# Patient Record
Sex: MALE | Race: WHITE | Employment: FULL TIME | URBAN - METROPOLITAN AREA
[De-identification: names, ages, dates, MRNs, and addresses within clinical notes are randomized per-mention and may not be internally consistent; named-entity substitution may affect disease eponyms.]

---

## 2017-04-10 PROBLEM — C67.9 MALIGNANT NEOPLASM OF URINARY BLADDER (HCC): Status: ACTIVE | Noted: 2017-04-10

## 2018-04-09 PROBLEM — K46.9 ABDOMINAL HERNIA: Status: ACTIVE | Noted: 2017-01-16

## 2020-05-20 PROBLEM — R63.4 WEIGHT LOSS: Status: ACTIVE | Noted: 2020-05-13

## 2021-04-09 ENCOUNTER — HOSPITAL ENCOUNTER (OUTPATIENT)
Dept: CT IMAGING | Age: 61
Discharge: HOME | End: 2021-04-09
Attending: UROLOGY
Payer: COMMERCIAL

## 2021-04-09 DIAGNOSIS — N40.1 BPH WITH OBSTRUCTION/LOWER URINARY TRACT SYMPTOMS: ICD-10-CM

## 2021-04-09 DIAGNOSIS — N13.8 BPH WITH OBSTRUCTION/LOWER URINARY TRACT SYMPTOMS: ICD-10-CM

## 2021-04-09 DIAGNOSIS — C67.9 MALIGNANT NEOPLASM OF URINARY BLADDER, UNSPECIFIED SITE (HCC): ICD-10-CM

## 2021-04-09 DIAGNOSIS — R30.0 DYSURIA: ICD-10-CM

## 2021-04-09 PROCEDURE — 74178 CT ABD&PLV WO CNTR FLWD CNTR: CPT

## 2021-04-09 PROCEDURE — 74011000636 HC RX REV CODE- 636: Performed by: UROLOGY

## 2021-04-09 RX ADMIN — IOPAMIDOL 100 ML: 755 INJECTION, SOLUTION INTRAVENOUS at 10:32

## 2021-04-13 ENCOUNTER — HOSPITAL ENCOUNTER (EMERGENCY)
Age: 61
Discharge: ANOTHER HOSPITAL | End: 2021-04-13
Attending: EMERGENCY MEDICINE
Payer: COMMERCIAL

## 2021-04-13 ENCOUNTER — HOSPITAL ENCOUNTER (INPATIENT)
Age: 61
LOS: 3 days | Discharge: HOME | DRG: 885 | End: 2021-04-16
Attending: PSYCHIATRY & NEUROLOGY | Admitting: PSYCHIATRY & NEUROLOGY
Payer: COMMERCIAL

## 2021-04-13 VITALS
WEIGHT: 200 LBS | SYSTOLIC BLOOD PRESSURE: 136 MMHG | TEMPERATURE: 98 F | OXYGEN SATURATION: 100 % | HEIGHT: 74 IN | BODY MASS INDEX: 25.67 KG/M2 | HEART RATE: 114 BPM | DIASTOLIC BLOOD PRESSURE: 101 MMHG | RESPIRATION RATE: 20 BRPM

## 2021-04-13 DIAGNOSIS — R45.851 SUICIDAL IDEATION: ICD-10-CM

## 2021-04-13 DIAGNOSIS — F41.9 SEVERE ANXIETY: Primary | ICD-10-CM

## 2021-04-13 DIAGNOSIS — C67.9 MALIGNANT NEOPLASM OF URINARY BLADDER, UNSPECIFIED SITE (HCC): ICD-10-CM

## 2021-04-13 DIAGNOSIS — F41.1 GAD (GENERALIZED ANXIETY DISORDER): Primary | ICD-10-CM

## 2021-04-13 PROBLEM — F32.A DEPRESSION: Status: ACTIVE | Noted: 2021-04-13

## 2021-04-13 LAB
ALBUMIN SERPL-MCNC: 4.5 G/DL (ref 3.4–5)
ALBUMIN/GLOB SERPL: 1.4 {RATIO} (ref 0.8–1.7)
ALP SERPL-CCNC: 116 U/L (ref 45–117)
ALT SERPL-CCNC: 49 U/L (ref 16–61)
AMPHET UR QL SCN: NEGATIVE
ANION GAP SERPL CALC-SCNC: 5 MMOL/L (ref 3–18)
APAP SERPL-MCNC: <2 UG/ML (ref 10–30)
APPEARANCE UR: CLEAR
AST SERPL-CCNC: 16 U/L (ref 10–38)
BARBITURATES UR QL SCN: NEGATIVE
BASOPHILS # BLD: 0.1 K/UL (ref 0–0.1)
BASOPHILS NFR BLD: 1 % (ref 0–2)
BENZODIAZ UR QL: NEGATIVE
BILIRUB SERPL-MCNC: 0.7 MG/DL (ref 0.2–1)
BILIRUB UR QL: NEGATIVE
BUN SERPL-MCNC: 14 MG/DL (ref 7–18)
BUN/CREAT SERPL: 16 (ref 12–20)
CALCIUM SERPL-MCNC: 8.8 MG/DL (ref 8.5–10.1)
CANNABINOIDS UR QL SCN: NEGATIVE
CHLORIDE SERPL-SCNC: 104 MMOL/L (ref 100–111)
CK MB CFR SERPL CALC: NORMAL % (ref 0–4)
CK MB SERPL-MCNC: <1 NG/ML (ref 5–25)
CK SERPL-CCNC: 91 U/L (ref 39–308)
CO2 SERPL-SCNC: 29 MMOL/L (ref 21–32)
COCAINE UR QL SCN: NEGATIVE
COLOR UR: YELLOW
COVID-19 RAPID TEST, COVR: NOT DETECTED
CREAT SERPL-MCNC: 0.9 MG/DL (ref 0.6–1.3)
DIFFERENTIAL METHOD BLD: ABNORMAL
EOSINOPHIL # BLD: 0.2 K/UL (ref 0–0.4)
EOSINOPHIL NFR BLD: 2 % (ref 0–5)
ERYTHROCYTE [DISTWIDTH] IN BLOOD BY AUTOMATED COUNT: 12.3 % (ref 11.6–14.5)
ETHANOL SERPL-MCNC: <3 MG/DL (ref 0–3)
GLOBULIN SER CALC-MCNC: 3.2 G/DL (ref 2–4)
GLUCOSE SERPL-MCNC: 135 MG/DL (ref 74–99)
GLUCOSE UR STRIP.AUTO-MCNC: NEGATIVE MG/DL
HCT VFR BLD AUTO: 52 % (ref 36–48)
HDSCOM,HDSCOM: NORMAL
HGB BLD-MCNC: 17.4 G/DL (ref 13–16)
HGB UR QL STRIP: NEGATIVE
KETONES UR QL STRIP.AUTO: ABNORMAL MG/DL
LEUKOCYTE ESTERASE UR QL STRIP.AUTO: NEGATIVE
LYMPHOCYTES # BLD: 1.4 K/UL (ref 0.9–3.6)
LYMPHOCYTES NFR BLD: 17 % (ref 21–52)
MAGNESIUM SERPL-MCNC: 2.4 MG/DL (ref 1.6–2.6)
MCH RBC QN AUTO: 29.8 PG (ref 24–34)
MCHC RBC AUTO-ENTMCNC: 33.5 G/DL (ref 31–37)
MCV RBC AUTO: 89.2 FL (ref 74–97)
METHADONE UR QL: NEGATIVE
MONOCYTES # BLD: 0.6 K/UL (ref 0.05–1.2)
MONOCYTES NFR BLD: 7 % (ref 3–10)
NEUTS SEG # BLD: 5.8 K/UL (ref 1.8–8)
NEUTS SEG NFR BLD: 72 % (ref 40–73)
NITRITE UR QL STRIP.AUTO: NEGATIVE
OPIATES UR QL: NEGATIVE
PCP UR QL: NEGATIVE
PH UR STRIP: 6 [PH] (ref 5–8)
PLATELET # BLD AUTO: 315 K/UL (ref 135–420)
PMV BLD AUTO: 8.8 FL (ref 9.2–11.8)
POTASSIUM SERPL-SCNC: 3.4 MMOL/L (ref 3.5–5.5)
PROT SERPL-MCNC: 7.7 G/DL (ref 6.4–8.2)
PROT UR STRIP-MCNC: NEGATIVE MG/DL
RBC # BLD AUTO: 5.83 M/UL (ref 4.35–5.65)
SALICYLATES SERPL-MCNC: <1.7 MG/DL (ref 2.8–20)
SODIUM SERPL-SCNC: 138 MMOL/L (ref 136–145)
SOURCE, COVRS: NORMAL
SP GR UR REFRACTOMETRY: 1.02 (ref 1–1.03)
TROPONIN I SERPL-MCNC: <0.02 NG/ML (ref 0–0.04)
UROBILINOGEN UR QL STRIP.AUTO: 1 EU/DL (ref 0.2–1)
WBC # BLD AUTO: 8 K/UL (ref 4.6–13.2)

## 2021-04-13 PROCEDURE — 83735 ASSAY OF MAGNESIUM: CPT

## 2021-04-13 PROCEDURE — 82077 ASSAY SPEC XCP UR&BREATH IA: CPT

## 2021-04-13 PROCEDURE — 80143 DRUG ASSAY ACETAMINOPHEN: CPT

## 2021-04-13 PROCEDURE — 74011250637 HC RX REV CODE- 250/637: Performed by: PSYCHIATRY & NEUROLOGY

## 2021-04-13 PROCEDURE — 93005 ELECTROCARDIOGRAM TRACING: CPT

## 2021-04-13 PROCEDURE — 65220000003 HC RM SEMIPRIVATE PSYCH

## 2021-04-13 PROCEDURE — 81003 URINALYSIS AUTO W/O SCOPE: CPT

## 2021-04-13 PROCEDURE — 74011250637 HC RX REV CODE- 250/637: Performed by: EMERGENCY MEDICINE

## 2021-04-13 PROCEDURE — 87635 SARS-COV-2 COVID-19 AMP PRB: CPT

## 2021-04-13 PROCEDURE — 99285 EMERGENCY DEPT VISIT HI MDM: CPT

## 2021-04-13 PROCEDURE — 80053 COMPREHEN METABOLIC PANEL: CPT

## 2021-04-13 PROCEDURE — 80179 DRUG ASSAY SALICYLATE: CPT

## 2021-04-13 PROCEDURE — 80307 DRUG TEST PRSMV CHEM ANLYZR: CPT

## 2021-04-13 PROCEDURE — 85025 COMPLETE CBC W/AUTO DIFF WBC: CPT

## 2021-04-13 PROCEDURE — 82553 CREATINE MB FRACTION: CPT

## 2021-04-13 RX ORDER — TAMSULOSIN HYDROCHLORIDE 0.4 MG/1
0.4 CAPSULE ORAL EVERY EVENING
Status: DISCONTINUED | OUTPATIENT
Start: 2021-04-13 | End: 2021-04-16 | Stop reason: HOSPADM

## 2021-04-13 RX ORDER — LORAZEPAM 1 MG/1
1 TABLET ORAL
Status: COMPLETED | OUTPATIENT
Start: 2021-04-13 | End: 2021-04-13

## 2021-04-13 RX ORDER — PAROXETINE HYDROCHLORIDE 20 MG/1
20 TABLET, FILM COATED ORAL DAILY
Status: DISCONTINUED | OUTPATIENT
Start: 2021-04-14 | End: 2021-04-14

## 2021-04-13 RX ORDER — THERA TABS 400 MCG
1 TAB ORAL DAILY
Status: DISCONTINUED | OUTPATIENT
Start: 2021-04-14 | End: 2021-04-16 | Stop reason: HOSPADM

## 2021-04-13 RX ORDER — HYDROXYZINE PAMOATE 50 MG/1
50 CAPSULE ORAL
Status: DISCONTINUED | OUTPATIENT
Start: 2021-04-13 | End: 2021-04-16 | Stop reason: HOSPADM

## 2021-04-13 RX ORDER — TRAZODONE HYDROCHLORIDE 50 MG/1
50 TABLET ORAL
Status: DISCONTINUED | OUTPATIENT
Start: 2021-04-13 | End: 2021-04-16 | Stop reason: HOSPADM

## 2021-04-13 RX ADMIN — LORAZEPAM 1 MG: 1 TABLET ORAL at 09:06

## 2021-04-13 RX ADMIN — TRAZODONE HYDROCHLORIDE 50 MG: 50 TABLET ORAL at 20:16

## 2021-04-13 RX ADMIN — TAMSULOSIN HYDROCHLORIDE 0.4 MG: 0.4 CAPSULE ORAL at 18:41

## 2021-04-13 RX ADMIN — HYDROXYZINE PAMOATE 50 MG: 50 CAPSULE ORAL at 20:16

## 2021-04-13 NOTE — PROGRESS NOTES
contacted by ED for voluntary inpt psych placement. If at any time Patient becomes involuntary- ED will need to contact Police for a paperless ECO (Emergency Custody Order) who will then call [facility] directly to assist with TDO as needed.  will contact all facilities and they will contact ED directly for questions or acceptances. CM does not need to be notified by staff once bed confirmed to ED by facility. Once all facilities have been contacted should a bed not be available through today. CM will resume search in the morning and continue to work toward transition of care.           CM contacted and provided MRN  to [facility], for review    CM contacted and provided MRN  To [facility], [place], and [facility] for review       CM faxed clinical information to [facility] for review    CM faxed clinical information to [facility] for review    CM faxed clinical information to [address] [place] [address] for review     CM faxed clinical information to [email or web address] [facility]  for review    CM faxed clinical information to [facility]  for review    CM faxed clinical information to [facility] for review     CM faxed clinical information to [facility]  for review    CM faxed clinical information to [employer] for review    CM faxed clinical information to Phuc Barr , [facility], [address] [place] [address], Hiram Guo  for review    CM faxed clinical information to [facility] for review     CM faxed clinical information to [facility]  for review    CM faxed clinical information to [facility]  for review    CM faxed clinical information to  [facility] for review     CM faxed clinical information to [facility]   for review    CM faxed clinical information to [facility] for review     CM faxed clinical information to [facility]  for review    CM faxed clinical information to [facility]  for review    CM faxed clinical information to [facility] for review     CM faxed clinical information to [facility] for review    CM faxed clinical information to [facility] for review    CM faxed clinical information to Lucy Velásquez for review    CM faxed clinical information to [facility]  for review

## 2021-04-13 NOTE — ED NOTES
While provider at bedside pt reported to him that he was suicidal. Sitter placed at bedside, and clothing being removed.  To be placed in paper scrubs, belongings to be secured at nurses station and blood and urine to be obtained

## 2021-04-13 NOTE — H&P
History and Physical        Patient: Erasmo Cortes Sex: male          DOA: 4/13/2021         YOB: 1960      Age:  64 y.o.        LOS:  LOS: 0 days        HPI:     Erasmo Cortes is a 64 y.o. ran out of medication, Ambien, was unable to sleep, was previously  prescribed Xanax. Was denied Xanax in ER and became suicidal.    Active Problems:    Depression (4/13/2021)        Past Medical History:   Diagnosis Date    Bladder cancer [facility])     Kidney stones        Past Surgical History:   Procedure Laterality Date    HX OTHER SURGICAL  11/14/2014    TURBT, Dr. Chaya Bernal [facility]  12/26/14    TURBT, [facility], Dr. Kolton Loredo  2007       Family History   Family history unknown: Yes       Social History     Socioeconomic History    Marital status: SINGLE     Spouse name: Not on file    Number of children: Not on file    Years of education: Not on file    Highest education level: Not on file   Tobacco Use    Smoking status: Never Smoker    Smokeless tobacco: Never Used   Substance and Sexual Activity    Alcohol use: Yes    Drug use: No       Prior to Admission medications    Medication Sig Start Date End Date Taking? Authorizing Provider   tamsulosin (FLOMAX) 0.4 mg capsule Take 1 Cap by mouth daily (after dinner). 4/12/21   Niels Krabbe, PA-C   trospium (SANCTURA XL) 60 mg capsule Take 1 Cap by mouth two (2) times a day. 3/31/21   Mary Hines NP   ALPRAZolam Kulwant Yarbrough) 0.5 mg tablet take 1 tablet by mouth twice a day if needed for anxiety 3/18/21   Provider, Historical   PARoxetine (PAXIL) 20 mg tablet take 1 tablet by mouth every morning 10/12/20   Provider, Historical   rosuvastatin (CRESTOR) 20 mg tablet take 1 tablet by mouth once daily 8/20/20   Provider, Historical   multivitamin (ONE A DAY) tablet Take 1 Tab by mouth daily.     Provider, Historical       No Known Allergies    Review of Systems  A comprehensive review of systems was negative except for that written in the History of Present Illness. Physical Exam:      Visit Vitals  /88 (BP 1 Location: Right arm, BP Patient Position: Sitting)   Pulse (!) 118   Temp 98 °F (36.7 °C)   Resp 22       Physical Exam:  Physical Exam: Patient complained of not sleeping well and mild discomfort in left side of abdomen. General:  Alert, cooperative, no distress, appears stated age. Eyes:  Conjunctivae/corneas clear. PERRL, EOMs intact. Fundi benign   Ears:  Normal TMs and external ear canals both ears. Nose: Nares normal. Septum midline. Mucosa normal. No drainage or sinus tenderness. Mouth/Throat: Lips, mucosa, and tongue normal. Teeth and gums normal.   Neck: Supple, symmetrical, trachea midline, no adenopathy, thyroid: no enlargement/tenderness/nodules, no carotid bruit and no JVD. Back:   Symmetric, no curvature. ROM normal. No CVA tenderness. Lungs:   Clear to auscultation bilaterally. Heart:  Regular rate and rhythm, S1, S2 normal, no murmur, click, rub or gallop. Abdomen:   Soft, non-tender. Bowel sounds normal. No masses,  No organomegaly. Lower left side, mildly TTP   Extremties: Extremities normal, atraumatic, no cyanosis or edema. Pulses: 2+ and symmetric all extremities. Skin: Skin color, texture, turgor normal. No rashes or lesions   Lymph nodes: Cervical, supraclavicular, and axillary nodes normal.   Neurologic: CNII-XII intact. Normal strength, sensation and reflexes throughout. Assessment/Plan     Patient was appropriate and calm.  Plan is for patent to participate in all unit activities, take medications as ordered and follow all discharge orders

## 2021-04-13 NOTE — ED PROVIDER NOTES
EMERGENCY DEPARTMENT HISTORY AND PHYSICAL EXAM    Date: 4/13/2021  Patient Name: Clinton Turner. History of Presenting Illness     Chief Complaint   Patient presents with    Mental Health Problem         History Provided By: Patient    Additional History (Context): Clinton Brooke is a 64 y.o. male with PMHX of malignant neoplasm of urinary bladder with staging of T1 N0 M0 urothelial carcinoma of bladder status post TURBT on November 14, 2014, severe anxiety regarding health who presents to the emergency department C/O exacerbation of exam.  Patient used to be prescribed Xanax to help for sleep which is no longer taking and they have switched him over to Ambien. He arrived asking to be admitted for his anxiety. I explained to him that he is not even taking any medications and they would not admit him for simple anxiety issues. I was called away from the room to see some of the patients when I returned he stated that he was now suicidal as the anxiety is so overwhelming he cannot function in life. His decision was that he would go home and overdose on pills other prescription or over-the-counter most likely Tylenol as he knows that this medication is toxic. Denies any suicide attempts in the past he denies any concerns regarding self-mutilation. Social History  Denies smoking drinking or drugs    Family History  No history of depression anxiety or suicide attempts      PCP: Abhishek Bryant NP    Current Outpatient Medications   Medication Sig Dispense Refill    tamsulosin (FLOMAX) 0.4 mg capsule Take 1 Cap by mouth daily (after dinner). 90 Cap 3    PARoxetine (PAXIL) 20 mg tablet take 1 tablet by mouth every morning      multivitamin (ONE A DAY) tablet Take 1 Tab by mouth daily.  trospium (SANCTURA XL) 60 mg capsule Take 1 Cap by mouth two (2) times a day.  60 Cap 0    ALPRAZolam (XANAX) 0.5 mg tablet take 1 tablet by mouth twice a day if needed for anxiety      rosuvastatin (CRESTOR) 20 mg tablet take 1 tablet by mouth once daily         Past History     Past Medical History:  Past Medical History:   Diagnosis Date    Bladder cancer ([address]     Kidney stones        Past Surgical History:  Past Surgical History:   Procedure Laterality Date    HX OTHER SURGICAL  11/14/2014    TURBT, Dr. Del Rosario Gelacio  12/26/14    TURBT, [facility], Dr. Frida Milan  2007       Family History:  Family History   Family history unknown: Yes       Social History:  Social History     Tobacco Use    Smoking status: Never Smoker    Smokeless tobacco: Never Used   Substance Use Topics    Alcohol use: Yes    Drug use: No       Allergies:  No Known Allergies      Review of Systems   Review of Systems   Constitutional: Negative for activity change and appetite change. Genitourinary: Positive for frequency. Hematological: Does not bruise/bleed easily. Psychiatric/Behavioral: Positive for dysphoric mood and suicidal ideas. The patient is nervous/anxious. All other systems reviewed and are negative. Physical Exam     Vitals:    04/13/21 0234 04/13/21 0540   BP: 132/86 (!) 143/97   Pulse: (!) 130 (!) 110   Resp: 16 16   Temp: 98 °F (36.7 °C) 98 °F (36.7 °C)   SpO2: 100% 97%   Weight: 90.7 kg (200 lb)    Height: 6' 2\" (1.88 m)      Physical Exam  Vitals signs and nursing note reviewed. Constitutional:       General: He is not in acute distress. Appearance: He is well-developed. He is not ill-appearing, toxic-appearing or diaphoretic. HENT:      Head: Normocephalic and atraumatic. Eyes:      General: No scleral icterus. Extraocular Movements:      Right eye: Normal extraocular motion. Left eye: Normal extraocular motion. Conjunctiva/sclera: Conjunctivae normal.      Pupils: Pupils are equal, round, and reactive to light. Neck:      Musculoskeletal: Normal range of motion and neck supple. Trachea: No tracheal deviation. Cardiovascular:      Rate and Rhythm: Regular rhythm. Tachycardia present. Heart sounds: Normal heart sounds. Pulmonary:      Effort: Pulmonary effort is normal. No respiratory distress. Breath sounds: Normal breath sounds. No stridor. Abdominal:      General: Bowel sounds are normal. There is no distension. Palpations: Abdomen is soft. Tenderness: There is no abdominal tenderness. There is no rebound. Musculoskeletal: Normal range of motion. General: No tenderness. Comments: Grossly unremarkable without abnormalities   Skin:     General: Skin is warm and dry. Capillary Refill: Capillary refill takes less than 2 seconds. Findings: No erythema or rash. Neurological:      Mental Status: He is alert and oriented to person, place, and time. GCS: GCS eye subscore is 4. GCS verbal subscore is 5. GCS motor subscore is 6. Cranial Nerves: No cranial nerve deficit. Motor: No weakness. Deep Tendon Reflexes: Reflexes normal.   Psychiatric:         Mood and Affect: Mood normal.         Speech: Speech is rapid and pressured. Behavior: Behavior normal.         Thought Content: Thought content normal.         Judgment: Judgment normal.      Comments: Initially, then developed rapid and pressured speech has he began expressing suicidal ideation.        Diagnostic Study Results     Labs -     Recent Results (from the past 12 hour(s))   COVID-19 RAPID TEST    Collection Time: 04/13/21  5:30 AM   Result Value Ref Range    Specimen source Nasopharyngeal      COVID-19 rapid test Not detected NOTD     URINALYSIS W/ RFLX MICROSCOPIC    Collection Time: 04/13/21  5:30 AM   Result Value Ref Range    Color YELLOW      Appearance CLEAR      Specific gravity 1.024 1.005 - 1.030      pH (UA) 6.0 5.0 - 8.0      Protein Negative NEG mg/dL    Glucose Negative NEG mg/dL    Ketone TRACE (A) NEG mg/dL    Bilirubin Negative NEG      Blood Negative NEG      Urobilinogen 1.0 0.2 - 1.0 EU/dL    Nitrites Negative NEG      Leukocyte Esterase Negative NEG     DRUG SCREEN, URINE    Collection Time: 04/13/21  5:30 AM   Result Value Ref Range    BENZODIAZEPINES Negative NEG      BARBITURATES Negative NEG      THC (TH-CANNABINOL) Negative NEG      OPIATES Negative NEG      PCP(PHENCYCLIDINE) Negative NEG      COCAINE Negative NEG      AMPHETAMINES Negative NEG      METHADONE Negative NEG      HDSCOM (NOTE)    EKG, 12 LEAD, INITIAL    Collection Time: 04/13/21  5:31 AM   Result Value Ref Range    Ventricular Rate 111 BPM    Atrial Rate 111 BPM    P-R Interval 152 ms    QRS Duration 84 ms    Q-T Interval 326 ms    QTC Calculation (Bezet) 443 ms    Calculated P Axis 43 degrees    Calculated R Axis 50 degrees    Calculated T Axis 43 degrees    Diagnosis       Sinus tachycardia  Otherwise normal ECG  No previous ECGs available     CBC WITH AUTOMATED DIFF    Collection Time: 04/13/21  5:33 AM   Result Value Ref Range    WBC 8.0 4.6 - 13.2 K/uL    RBC 5.83 (H) 4.35 - 5.65 M/uL    HGB 17.4 (H) 13.0 - 16.0 g/dL    HCT 52.0 (H) 36.0 - 48.0 %    MCV 89.2 74.0 - 97.0 FL    MCH 29.8 24.0 - 34.0 PG    MCHC 33.5 31.0 - 37.0 g/dL    RDW 12.3 11.6 - 14.5 %    PLATELET 643 376 - 700 K/uL    MPV 8.8 (L) 9.2 - 11.8 FL    NEUTROPHILS 72 40 - 73 %    LYMPHOCYTES 17 (L) 21 - 52 %    MONOCYTES 7 3 - 10 %    EOSINOPHILS 2 0 - 5 %    BASOPHILS 1 0 - 2 %    ABS. NEUTROPHILS 5.8 1.8 - 8.0 K/UL    ABS. LYMPHOCYTES 1.4 0.9 - 3.6 K/UL    ABS. MONOCYTES 0.6 0.05 - 1.2 K/UL    ABS. EOSINOPHILS 0.2 0.0 - 0.4 K/UL    ABS.  BASOPHILS 0.1 0.0 - 0.1 K/UL    DF AUTOMATED     METABOLIC PANEL, COMPREHENSIVE    Collection Time: 04/13/21  5:33 AM   Result Value Ref Range    Sodium 138 136 - 145 mmol/L    Potassium 3.4 (L) 3.5 - 5.5 mmol/L    Chloride 104 100 - 111 mmol/L    CO2 29 21 - 32 mmol/L    Anion gap 5 3.0 - 18 mmol/L    Glucose 135 (H) 74 - 99 mg/dL    BUN 14 7.0 - 18 MG/DL    Creatinine 0.90 0.6 - 1.3 MG/DL    BUN/Creatinine ratio 16 12 - 20      GFR est AA >60 >60 ml/min/1.73m2    GFR est non-AA >60 >60 ml/min/1.73m2    Calcium 8.8 8.5 - 10.1 MG/DL    Bilirubin, total 0.7 0.2 - 1.0 MG/DL    ALT (SGPT) 49 16 - 61 U/L    AST (SGOT) 16 10 - 38 U/L    Alk. phosphatase 116 45 - 117 U/L    Protein, total 7.7 6.4 - 8.2 g/dL    Albumin 4.5 3.4 - 5.0 g/dL    Globulin 3.2 2.0 - 4.0 g/dL    A-G Ratio 1.4 0.8 - 1.7     MAGNESIUM    Collection Time: 04/13/21  5:33 AM   Result Value Ref Range    Magnesium 2.4 1.6 - 2.6 mg/dL   CARDIAC PANEL,(CK, CKMB & TROPONIN)    Collection Time: 04/13/21  5:33 AM   Result Value Ref Range    CK - MB <1.0 <3.6 ng/ml    CK-MB Index  0.0 - 4.0 %     CALCULATION NOT PERFORMED WHEN RESULT IS BELOW LINEAR LIMIT    CK 91 39 - 308 U/L    Troponin-I, QT <0.02 0.0 - 0.045 NG/ML   ETHYL ALCOHOL    Collection Time: 04/13/21  5:33 AM   Result Value Ref Range    ALCOHOL(ETHYL),SERUM <3 0 - 3 MG/DL   ACETAMINOPHEN    Collection Time: 04/13/21  5:33 AM   Result Value Ref Range    Acetaminophen level <2 (L) 10.0 - 49.3 ug/mL   SALICYLATE    Collection Time: 04/13/21  5:33 AM   Result Value Ref Range    Salicylate level <9.4 (L) 2.8 - 20.0 MG/DL       Radiologic Studies -   No orders to display     CT Results  (Last 48 hours)    None        CXR Results  (Last 48 hours)    None          Medications given in the ED-  Medications - No data to display      Medical Decision Making   I am the first provider for this patient. I reviewed the vital signs, available nursing notes, past medical history, past surgical history, family history and social history. Vital Signs-Reviewed the patient's vital signs. Pulse Oximetry Analysis -97% on room air    Cardiac Monitor:  Rate: 103 bpm  Rhythm: Borderline sinus tach    EKG interpretation: (Preliminary)  6:36 AM   Sinus tachycardia, rate 111, normal ST segments, no preexcitation, QTC is 443.   EKG read by Cyrus Alexis MD     Records Reviewed: NURSING NOTES AND PREVIOUS MEDICAL RECORDS    Provider Notes (Medical Decision Making):   Patient was initially a acute anxiety exacerbation but after brief conversation he repeatedly was asking to be admitted for anxiety. Is a repeatedly explained to him that admission for anxiety was not an option he then switched his argument then returned stating that he was suicidal.  I honestly believe this is a manipulation however he clearly explains he desired plan to go home and overdose and either hold sedative hypnotic Ambien which she does have lying around the house or simple Tylenol as he knows this medication is quite effective and causing liver damage. As he explained this to me we did switch and elects to perform medical clearance labs including urine EKG and see case management for assistance in placement. Procedures:  Procedures    ED Course:   4:36 AM: Initial assessment performed. The patients presenting problems have been discussed, and they are in agreement with the care plan formulated and outlined with them. I have encouraged them to ask questions as they arise throughout their visit. SIGN OUT:  7:09 AM  Patient's presentation, labs/imaging and plan of care was reviewed with Dr. Christel Nguyen as part of sign out. They will continue to monitor and await his input from case management as part of the plan discussed with the patient. 10:00 AM  Updated patient on all results and plan. All questions answered. TRANSFER PROGRESS NOTE:  10:01 AM  Discussed impending transfer with patient. Patient was instructed that [facility] not have inpatient psychiatric services services and that patient would be transferred to [facility] behavioral center. Patient understands and agrees with care plan. .       Diagnosis and Disposition     CRITICAL CARE NOTE:  10:49 AM  I have spent 45 minutes of critical care time involved in lab review, consultations with specialist, family decision-making, and documentation.   During this entire length of time I was immediately available to the patient. Critical Care: The reason for providing this level of medical care for this critically ill patient was due a critical illness that impaired one or more vital organ systems such that there was a high probability of imminent or life threatening deterioration in the patients condition. This care involved high complexity decision making to assess, manipulate, and support vital system functions, to treat this degreee vital organ system failure and to prevent further life threatening deterioration of the patients condition. CLINICAL IMPRESSION:    1. Severe anxiety    2. Suicidal ideation    3. Malignant neoplasm of urinary bladder, unspecified site ([facility] [address]        PLAN:  1. Transfer to inpatient psychiatric unit at [facility] for further psychiatric management.  ________________    This note was partially transcribed via voice recognition software. Although efforts have been made to catch any discrepancies, it may contain sound alike words, grammatical errors, or nonsensical words.

## 2021-04-13 NOTE — BSMART NOTE
OCCUPATIONAL THERAPY PROGRESS NOTE Group Time:  0208 Attendance: The patient attended full group. Participation: The patient participated fully in the activity. Attention: The patient was able to focus on the activity. Interaction: The patient occasionally  interacts with others. States he has gone through depressions much of his life, not constant. Denies active suicidal thoughts. Participated actively. States he worries about many things and this is a change he wants to work on. Discussed various ways to begin this.

## 2021-04-13 NOTE — ED TRIAGE NOTES
Patient arrives ambulatory from home c/o stress issues about his health. Patient states he has been told his health is fine but cannot destress. Denies SI/ HI. Reports he feels like he cannot relax. AOX4. Patient states he took xanax that helped but think he needs something stronger. Patient reports the anxiety is worse at night.

## 2021-04-13 NOTE — ED NOTES
Contacted by Case management at this time with an accepting physician MD Alfonso Hollis at [facility]. To call report in 20 minutes per Case management.

## 2021-04-13 NOTE — ED NOTES
Report called to [facility] to [facility]. All questions asked and answered.

## 2021-04-13 NOTE — BH NOTES
64year old  male admitted to unit for SI with a plan to overdose on his sleeping pills. Past medical and psychiatric history to include bladder cancer, kidney stones, enlarged prostrate, abdominal hernia with mesh repair, foot drop to his Right leg, diverticulosis, depression, and anxiety. Pt denies having an outpatient therapist. Primary Care Provider is Nurse Yumiko Sim at [facility]. See Medication reconciliation for detailed medication list. Pt states, \"I took Lexapro, Paxil, and Celexa in the past.\" Urine drug screen  negative. [address] <3. No Known Allergies. COVID negative. Upon arrival to the unit, pt presents with dull affect, depressed mood, anxious at times. Pt states, \"I get obsessive thoughts about my bladder cancer coming back. I got tested back in March, and everything was clear. It just always crosses my mind. I can't fall asleep because I just get so worried. I ran out of my Ambien last night, and didn't know what to do. I needed something for anxiety. I feel like I've lost 10 lbs in the past month because I have no appetite. \" Pt was compliant with admission process. Admission paperwork placed in pt's chart. Pt was searched for contraband and none was found. Pt was oriented to unit and encouraged to seek staff for questions or concerns. Will continue to monitor. RN's will initiate, develop, implement, review, and revise treatment plan.

## 2021-04-13 NOTE — ED NOTES
When asked about suicidal ideation patient states \"Yeah, I've had thoughts of being and wishing I was dead, but I don't think I would ever do it. I don't actually want to be dead. \" Patient is low risk for suicide per MD Pipo Gayle.

## 2021-04-13 NOTE — ED NOTES
Pt reports feeling nervous about health concerns. No specific Sx.  To wait for MD mckeon before any additional treatments

## 2021-04-13 NOTE — PROGRESS NOTES
Chart reviewed noted elevated blood pressure, facilities will need more stable readings to be considered for admission and medically cleared.

## 2021-04-14 PROBLEM — K46.9 ABDOMINAL HERNIA: Status: RESOLVED | Noted: 2017-01-16 | Resolved: 2021-04-14

## 2021-04-14 PROBLEM — F33.1 MODERATE EPISODE OF RECURRENT MAJOR DEPRESSIVE DISORDER (HCC): Chronic | Status: ACTIVE | Noted: 2021-04-13

## 2021-04-14 LAB
ATRIAL RATE: 111 BPM
CALCULATED P AXIS, ECG09: 43 DEGREES
CALCULATED R AXIS, ECG10: 50 DEGREES
CALCULATED T AXIS, ECG11: 43 DEGREES
DIAGNOSIS, 93000: NORMAL
P-R INTERVAL, ECG05: 152 MS
Q-T INTERVAL, ECG07: 326 MS
QRS DURATION, ECG06: 84 MS
QTC CALCULATION (BEZET), ECG08: 443 MS
VENTRICULAR RATE, ECG03: 111 BPM

## 2021-04-14 PROCEDURE — 74011250637 HC RX REV CODE- 250/637: Performed by: PSYCHIATRY & NEUROLOGY

## 2021-04-14 PROCEDURE — 65220000003 HC RM SEMIPRIVATE PSYCH

## 2021-04-14 PROCEDURE — 99222 1ST HOSP IP/OBS MODERATE 55: CPT | Performed by: PSYCHIATRY & NEUROLOGY

## 2021-04-14 RX ORDER — CLONAZEPAM 0.5 MG/1
0.5 TABLET ORAL DAILY
Status: DISCONTINUED | OUTPATIENT
Start: 2021-04-14 | End: 2021-04-16 | Stop reason: HOSPADM

## 2021-04-14 RX ORDER — SERTRALINE HYDROCHLORIDE 50 MG/1
50 TABLET, FILM COATED ORAL DAILY
Status: DISCONTINUED | OUTPATIENT
Start: 2021-04-14 | End: 2021-04-16 | Stop reason: HOSPADM

## 2021-04-14 RX ORDER — CLONAZEPAM 0.5 MG/1
1 TABLET ORAL
Status: DISCONTINUED | OUTPATIENT
Start: 2021-04-14 | End: 2021-04-16 | Stop reason: HOSPADM

## 2021-04-14 RX ADMIN — CLONAZEPAM 1 MG: 0.5 TABLET ORAL at 20:24

## 2021-04-14 RX ADMIN — TRAZODONE HYDROCHLORIDE 50 MG: 50 TABLET ORAL at 20:24

## 2021-04-14 RX ADMIN — CLONAZEPAM 0.5 MG: 0.5 TABLET ORAL at 13:32

## 2021-04-14 RX ADMIN — TAMSULOSIN HYDROCHLORIDE 0.4 MG: 0.4 CAPSULE ORAL at 20:24

## 2021-04-14 RX ADMIN — SERTRALINE 50 MG: 50 TABLET, FILM COATED ORAL at 13:33

## 2021-04-14 RX ADMIN — HYDROXYZINE PAMOATE 50 MG: 50 CAPSULE ORAL at 06:59

## 2021-04-14 NOTE — PROGRESS NOTES
Problem: Suicide  Goal: *STG: Remains safe in hospital  Description: Pt will be free of SI, intent, or plan as well as self-injurious behaviors every shift. Outcome: Progressing Towards Goal  Goal: *STG/LTG: Complies with medication therapy  Description: Pt will take medications as prescribed every shift. Outcome: Progressing Towards Goal   Pt. is visible in the dayroom. He interacts well with his peers and staff. He attended and participated the evening group. He denies SI,HI, or AVH. Will continue to monitor for safety and provide support as needed.

## 2021-04-14 NOTE — GROUP NOTE
[facility]  GROUP DOCUMENTATION INDIVIDUAL Group Therapy Note Date: 4/13/2021 Group Start Time: 1 Group End Time: 1945 Group Topic: Nursing [facility] 1 SPECIAL TRTMT 1 Melvina Patel RN 
 
[facility] GROUP DOCUMENTATION GROUP Group Therapy Note Attendees: 5 Attendance: Attended Patient's Goal:  Self love. Interventions/techniques: Informed, Validated and Promoted peer support Follows Directions: Followed directions Interactions: Interacted appropriately Mental Status: Calm Behavior/appearance: Cooperative Goals Achieved: Able to engage in interactions, Able to listen to others and Identified feelings Laureano No RN

## 2021-04-14 NOTE — H&P
[address]  HISTORY AND PHYSICAL    Name:  Mechelle Simental  MR#:   [id number]  :  1960  ACCOUNT #:  [de-identified]  ADMIT DATE:  2021    IDENTIFYING DATA:  The patient is a 57-year-old single white male, resident of [place], [place], who is covered by [employer]. He works as a  for a [employer]. BASIS FOR ADMISSION:  The patient is admitted after referral to us following presentation to emergency room for depression and anxiety. He has a long history of depression and panic, having been treated over the past 10 years with a variety of serotonin reuptake inhibitors, knowing that he had been on Celexa which helped him to feel better, then later on Lexapro which worked first time, and then a second time when he tried, it caused him to feel again anxiety disorder and then Paxil. He had been using Paxil intermittently several times, discontinued it each time when he would start to feel better. He also was having sleep problems, so he would take alprazolam twice a day, especially at night for sleep. This was no longer working, and his family doctor had switched him over to Ambien 5 mg at night which was not helping, and the patient had changed it on his own up to 15 mg which still did not work for sleep. He does have a history since  of malignancy of the bladder and said he had been clear for the past 5 years, but does continue to worry about this. He recently had return of a lower abdomen pain and again got increasing anxiety. Nothing was found of this. He had resumed back on Paxil 20 mg which then did not seem to work for him. He was switched to Lexapro 20 mg which made him tense and then went back on Paxil again which did not feel to be working this time. He described sleep problems, anxiety, panic, depression, somewhat helpless feelings. He said that he would startle easily.   He continued to obsess on possibility of and worries of a negative outcome. He was receiving medicines from his nurse practitioner MsTroy Venkat Estrella through [facility]. Most recent medication was Paxil 10 mg and Ambien 15 mg at bedtime. The Ambien was not prescribed at this dose, which was higher than recommended dosing. MEDICAL HISTORY:  Significant for the above-noted neoplasm of the bladder. He does have history of abdominal wall hernia, history of diverticulosis as well as polyps of the colon, history of elevated PSA, history of benign prostatic hypertrophy. History of hyperlipidemia. Review of systems was otherwise negative    ALLERGIES:  HE DENIED FOOD OR DRUG ALLERGIES. DIAGNOSTIC DATA:  Laboratory testing done in the emergency room included CBC which showed elevation of hemoglobin 17.4 g/dL and elevated hematocrit 52.0 with the remainder normal.  Urinalysis showed a concentrated specimen with specific gravity 1.024, trace ketones but otherwise normal.  The comprehensive metabolic panel showed slight decreased potassium 3.4 mmol/L, slight elevation of glucose 136 mg/dL on a spot blood draw, normal liver function tests, normal CPK, negative acetaminophen and salicylate levels, negative urine drug screen, negative alcohol level, negative rapid SARS COVID test.  The 12-lead EKG showed ventricular rate 111 beats and sinus tachycardia, otherwise normal EKG. SUBSTANCE USE HISTORY:  The patient denied drug, alcohol or tobacco abuse. He had quit using a chewing tobacco.    SOCIAL HISTORY AND FAMILY HISTORY:  Family history is significant for uncle with psychiatric history, father with Parkinson's disease, another uncle with ALS. He does work as a . He had been working from home. MENTAL STATUS EXAMINATION:  Revealed him to be somewhat odd white male. Speech was fluent. Mood was unhappy to anxious with a somewhat odd affect. Thought processing was logical and goal-directed.   He denied hallucinatory or delusional material.  Memory and cognition were intact. He had endorsed suicidal ideas at the time of admission, but he was denying them currently. IQ was estimated in the normal range. Insight and judgment were influenced by his anxiety and unhappiness with life situation. ASSESSMENT:  AXIS I:  Major depression, recurrent, moderate. Generalized anxiety disorder. AXIS II:  Schizotypal and compulsive personality features. AXIS III:  History of malignant neoplasm of bladder. Benign prostatic hypertrophy. History of polyp and diverticulosis of colon. History of elevated prostate-specific antigen. History of hyperlipidemia. TREATMENT PLAN:  This patient is admitted voluntarily after presentation to emergency room. He was feeling overwhelmed with situation and was unable to get emergency evaluation as outpatient. This caused him feel more anxious with thought of suicide and could not contract for safety. We will continue with individual, group and milieu therapies, art and recreation therapy, case management services, social work services. We will write for clonazepam 0.5 mg in the morning and 1 mg at night for sleep and anxiety and initiate Zoloft antidepressant 50 mg daily. He may require higher dosing of this, but this will be decided as an outpatient. He will require a referral to practice in the [facility], and I also recommend he be set up to see a therapist, especially work on supportive counseling and anxiety management. ESTIMATED LENGTH OF STAY:  3-4 days.       Marlys Youssef MD      [id number]  D:  04/14/2021 13:17  T:  04/14/2021 16:37  JOB #:  [id number]

## 2021-04-14 NOTE — PROGRESS NOTES
conducted an Spirituality Group for [employer] who is a 64 y. o.,male. Patient's Primary Language is: [place]. According to the patient's EMR Religion Affiliation is: Daniel Bansal. The reason the Patient came to the hospital is:   Patient Active Problem List    Diagnosis Date Noted    Moderate episode of recurrent major depressive disorder ([facility] [address] 04/13/2021    Weight loss 05/13/2020    Malignant neoplasm of urinary bladder ([facility] [address] 04/10/2017    Diverticulosis of large intestine 10/19/2016    Polyp of colon 12/15/2015    Abnormal liver enzymes 10/20/2015    Elevated prostate specific antigen (PSA) 10/20/2015    Hyperlipidemia 10/20/2015    FRANCIS (generalized anxiety disorder) 01/13/2015    Insomnia 01/13/2015    Foot-drop 01/19/2011    Abdominal wall hernia 03/16/2010    History of renal calculi 04/18/2007        Chapvidal conducted Spirituality Group for \"The World on My Shoulders\", the various types of burden/weight we carry and patient was one of six participants. The  provided the following Interventions:  Continued the relationship of care and support. Discussed the meaning of stress, the various types of visible & invisible burdens. Discussed tools we have used in the past & present to help us cope with stress. Listened empathically. Offered prayer and assurance of continued prayer on patients behalf. Encouraged patient to request a  as needed    The following outcomes were achieved:  Patient fully participated in group discussion  Patient shared the importance of talking to somebody. Don't keep it all to yourself. Patient recognized family as a helpful tool/resource  Patient expressed gratitude for 's visit. Plan:  Chaplains will continue to follow and will provide pastoral care on an as needed/requested basis.  recommends bedside caregivers page  on duty if patient shows signs of acute spiritual or emotional distress. [address] [place] [address]   [phone]

## 2021-04-14 NOTE — BH NOTES
Group Therapy Note    Patient: Clinton Brooke Patient # in Group: 5    Group Type: Process Group    Group Time: 30 minutes    Method used: Free discussion    Attendance: Clinton Brooke was      compliant with group and participated fully for 100% of the duration. Interaction Narrative: Clinton Turner had a Depressed and Anxious mood, was Cooperative during group and Michoacano Conner Jr.'s thought content was Goal Directed. Writer Reinforced patient's understanding of how to process anxiety, grief, depressive thoughts, and use them for positive results in other areas of life. Patient was Able to listen to others, Able to reflect/comment on own behavior and Able to receive feedback. Will continue to monitor and provide redirection, education, and support as needed.

## 2021-04-14 NOTE — BSMART NOTE
OCCUPATIONAL THERAPY PROGRESS NOTE Group Time:  1400 Attendance: The patient attended full group. Participation: The patient participated fully in the activity. Attention: The patient was able to focus on the activity. Interaction: The patient occasionally  interacts with others. Active in participation. Seems to enjoy interacting.

## 2021-04-14 NOTE — BSMART NOTE
ART THERAPY GROUP PROGRESS NOTE PATIENT SCHEDULED FOR GROUP AT: 5274 ATTENDANCE: Full PARTICIPATION LEVEL:  Participates fully in the art process ATTENTION LEVEL : Able to focus on task FOCUS: Anxiety reduction SYMBOLIC & THEMATIC CONTENT AS NOTED IN IMAGERY: He was calm, compliant, and presented with a bright affect. He was invested in the task at hand and actively participated in group discussion. He claimed that he is a \"hypochondriac\" and is often anxious about his physical health. He offered insight and encouragement to group members during group discussion.

## 2021-04-14 NOTE — BSMART NOTE
[address] [facility] Biopsychosocial Assessment Current Level of Psychosocial Functioning  
 
[x]Independent 
[]Dependent []Minimal Assist 
 
 
Comments:   
 
Psychosocial High Risk Factors (check all that apply) [x]Unable to obtain meds []Chronic illness/pain []Substance abuse  
[]Lack of Family Support []Financial stress []Isolation []Inadequate [employer] [x]Suicide attempt(s) [x]Not taking medications []Victim of crime []Developmental Delay 
[]Unable to manage personal needs []Age 72 or older  
[]  Homeless []Ross transportation []Readmission within 30 days []Unemployment []Traumatic Event Psychiatric Advanced Directive:  none Family to involve in treatment: Yes Sexual Orientation: NA 
  
Patient Strengths: Pt. is willing to seek treatment Patient Barriers: Pt. expressed she is not Substance Abuse: Yes. Safety plan: ADA discussed safety plan. CMHC/MH history: Please refer to the psychiatrist and NP or PA note Depression Plan of Care: ADA discussed and encouraged pt. to participate in the following activities: medication management, group/individual therapies, family meetings, psycho education, treatment team meetings to assist with stabilization Initial Discharge Plan: 3-5 days Clinical Summary:   Pt. is a 72-year-old male with history of Depression. Pt. was admitted to this facility for ideations to harm self-due to sleep impairment, decrease appetite and ideations to harm self. Pt.s case was discussed this a.m.  ADA met with pt. to discuss this admission. I had not been able to sleep for a couple of days in addition to having a  appetite. I came to the hospital because I ran out of medications. My PCP prescribed medications for depression and sleep issues.      I sated I was going to kill myself if the emergency room sent me home. Could not go without sleep. I came here for help. Pt expressed he feels better today after getting some sleep last night. Pt. informed SW he has cycles where he gets depressed, decline in appetite, and impaired sleep. Pt almost he does not have a psychiatrist nor a therapist. Princess Yanez discussed the benefits of outpt therapy. SW will assist pt. with a referral to a therapist and psychiatrist in [facility] or [place], [place]. SW discussed self-efficacy, positive coping skills, safety plan and aftercare. Pt.'s mood appears to be improving, contracts for safety and has  Fair insight. Pt plans at dc to return to his home address near his family. SW will assist pt with support and counseling towards dc planning.  
 
 
Terry Burrell MA, LMHP-R

## 2021-04-15 PROCEDURE — 65220000003 HC RM SEMIPRIVATE PSYCH

## 2021-04-15 PROCEDURE — 74011250637 HC RX REV CODE- 250/637: Performed by: PSYCHIATRY & NEUROLOGY

## 2021-04-15 PROCEDURE — 99231 SBSQ HOSP IP/OBS SF/LOW 25: CPT | Performed by: PSYCHIATRY & NEUROLOGY

## 2021-04-15 RX ADMIN — TAMSULOSIN HYDROCHLORIDE 0.4 MG: 0.4 CAPSULE ORAL at 17:29

## 2021-04-15 RX ADMIN — THERA TABS 1 TABLET: TAB at 08:42

## 2021-04-15 RX ADMIN — CLONAZEPAM 1 MG: 0.5 TABLET ORAL at 21:10

## 2021-04-15 RX ADMIN — SERTRALINE 50 MG: 50 TABLET, FILM COATED ORAL at 08:42

## 2021-04-15 RX ADMIN — CLONAZEPAM 0.5 MG: 0.5 TABLET ORAL at 08:42

## 2021-04-15 NOTE — BH NOTES
Patient is polite and manner-able. Patient spent the majority of this shift in the milieu; watching TV, socializing and walking. Patient attended and participated in all groups given this shift. Patient ate his dinner with no complaints and had snacks during snack time. Patient received clothes and snacks during this shift from a family member. This writer will continue to monitor patient for safety.

## 2021-04-15 NOTE — BSMART NOTE
Pt. is a 60-year-old male with history of Depression. Pt. was admitted to this facility for ideations to harm self-due to sleep impairment, decrease appetite and ideations to harm self. SW Contact:  SW met with pt to discuss dc planning. \" I feel much better today\". \" I have been able to sleep , and I gained at least 3 pounds\". Pt. Expressed he feels that he is getting back on track. Pt states he has moments were he will feel a little dysphoric in the morning , but starts to perk up a little more at noon time. SW discussed continued coping strategies, positive self talk and safety plan. Pt denies ideations and hallucinations. Pt.'s mood appears to be improving, less anxious and has fair insight. Pt expressed he is hoping to be dc tomorrow. Pt states he plans to return to his home, with family support.   SW will assist pt with support and counseling towards dc planning. 
  
  
Verner Butts MA, LMHP-R

## 2021-04-15 NOTE — PROGRESS NOTES
Behavioral Health Progress Note    Admit Date: 4/13/2021  Hospital day 2    Vitals : No data found. Labs:  No results found for this or any previous visit (from the past 24 hour(s)). Meds:   Current Facility-Administered Medications   Medication Dose Route Frequency    sertraline (ZOLOFT) tablet 50 mg  50 mg Oral DAILY    clonazePAM (KlonoPIN) tablet 1 mg  1 mg Oral QHS    clonazePAM (KlonoPIN) tablet 0.5 mg  0.5 mg Oral DAILY    hydrOXYzine pamoate (VISTARIL) capsule 50 mg  50 mg Oral Q4H PRN    traZODone (DESYREL) tablet 50 mg  50 mg Oral QHS PRN    therapeutic multivitamin (THERAGRAN) tablet 1 Tab  1 Tab Oral DAILY    tamsulosin (FLOMAX) capsule 0.4 mg  0.4 mg Oral QPM      Hospital Problems: Principal Problem: Moderate episode of recurrent major depressive disorder ([facility] [address] (4/13/2021)    Active Problems:    FRANCIS (generalized anxiety disorder) (1/13/2015)        Subjective:   Medication side effects: none  none    Mental Status Exam  Sensorium: alert  Orientation: only aware of  time, place and person  Relations: cooperative  Eye Contact: appropriate  Appearance: is unkempt  Thought Process: normal rate of thoughts and fair abstract reasoning/computation   Thought Content: no evidence of impairment   Suicidal: denies   Homicidal: none   Mood: is anxious   Affect: stable  Memory: shows no evidence of impairment     Concentration: fair  Abstraction: concrete  Insight: The patient's insight shows no evidence of impairment    OR Fair  Judgement: is psychologically impaired OR  Fair    Assessment/Plan:   improved    Continue close observation      Nurses report that the patient has been cooperative and pleasant to the staff. He has been out in the day room and participating in groups and activity. When not doing so, he walks to try to get in his number of steps for his Fitbit. He says he is not feeling 100% but feels much better today compared to how he felt before he came here.   He knows that he needs to be set up over on the [facility] with provider for his medication management and for therapy. He says diet here at the hospital is much better than he eats at home because they are he is alone and often tends to only eat junk food. Degree of education about diet and exercise was done. He did say that his energy level had been low when he first got here but feels much better, especially as he is gotten some sleep. He denies homicidal or suicidal ideas today. He denies medication complaints with the prescription of the Zoloft. He describes decrease in anxiety with the use of the low-dose clonazepam and also says he is sleeping better with the slightly higher dosing at bedtime.   He does look improved and has a positive outlook so I would plan on discharge tomorrow with outpatient follow-up

## 2021-04-15 NOTE — PROGRESS NOTES
Problem: Depressed Mood (Adult/Pediatric)  Goal: *STG: Attends activities and groups  Description: Pt will attend 3 out of 5 groups daily throughout hospitalization. Outcome: Progressing Towards Goal     Problem: Depressed Mood (Adult/Pediatric)  Goal: *STG: Complies with medication therapy  Description: Pt will take medications as prescribed every shift. Outcome: Progressing Towards Goal     Patient has been very pleasant toward staff and peers this shift. Patient has been compliant with unit guidelines, free from falls and harm. Patient has been compliant with scheduled medications and has not required PRN medications this shift. Patient has attended all groups and has been active and appropriate participant. Patient has been coping with anxiousness this shift by walking throughout unit. Patient has step counter and appears to enjoy \"seeing\" how many steps he takes each day. Patient has eaten meals and snacks and was weighed this shift (per patient request) and stated he has gained 3 pounds since arriving onto unit. Will continue to monitor and provide encouragement and interventions as appropriate.

## 2021-04-15 NOTE — BSMART NOTE
OCCUPATIONAL THERAPY PROGRESS NOTE Group Time:  5432 Attendance: The patient attended full group. Participation: The patient participated fully in the activity. Attention: The patient was able to focus on the activity. Interaction: The patient frequently interacts with others. Able to ID some positive self talk he could use.

## 2021-04-15 NOTE — BSMART NOTE
SOCIAL WORK GROUP THERAPY PROGRESS NOTE Group Time:  10:15am   
 
Group Topic:  Coping Skills    C D Issues Group Participation:   
 
Pt moderately involved during group discussion but remained attentive. Still flat affect, but claims not as dysphoric. \"Seven Steps\" for taking responsibility for our Happiness was reviewed including commitment to change, self-care, setting limits, goal setting & letting go. Reviewed strategies to keep a \"Journal\" for moods, cognitions, behavior & outcome. Admitted needs to broaden support system.

## 2021-04-15 NOTE — BSMART NOTE
ART THERAPY GROUP PROGRESS NOTE PATIENT SCHEDULED FOR GROUP AT: 950 ATTENDANCE: Full PARTICIPATION LEVEL: Participates fully in the art process ATTENTION LEVEL : Able to focus on task FOCUS: Strengths SYMBOLIC & THEMATIC CONTENT AS NOTED IN IMAGERY: He was calm and compliant with bright and cheerful affect. He was engaged and invested in the task at hand. His approach was organized and planned out. He discussed his strengths of \"friendliness\" and interacted with peers positively and with encouragement. Art Therapy Intern administered group art therapy

## 2021-04-16 VITALS
HEART RATE: 82 BPM | SYSTOLIC BLOOD PRESSURE: 130 MMHG | TEMPERATURE: 97.1 F | DIASTOLIC BLOOD PRESSURE: 72 MMHG | RESPIRATION RATE: 18 BRPM

## 2021-04-16 PROCEDURE — 74011250637 HC RX REV CODE- 250/637: Performed by: PSYCHIATRY & NEUROLOGY

## 2021-04-16 PROCEDURE — 99238 HOSP IP/OBS DSCHRG MGMT 30/<: CPT | Performed by: PSYCHIATRY & NEUROLOGY

## 2021-04-16 RX ORDER — CLONAZEPAM 1 MG/1
1 TABLET ORAL
Qty: 30 TAB | Refills: 0 | Status: SHIPPED | OUTPATIENT
Start: 2021-04-16 | End: 2021-04-19

## 2021-04-16 RX ORDER — CLONAZEPAM 0.5 MG/1
0.5 TABLET ORAL DAILY
Qty: 30 TAB | Refills: 0 | Status: SHIPPED | OUTPATIENT
Start: 2021-04-17

## 2021-04-16 RX ORDER — SERTRALINE HYDROCHLORIDE 50 MG/1
50 TABLET, FILM COATED ORAL DAILY
Qty: 30 TAB | Refills: 0 | Status: SHIPPED | OUTPATIENT
Start: 2021-04-17

## 2021-04-16 RX ADMIN — CLONAZEPAM 0.5 MG: 0.5 TABLET ORAL at 08:43

## 2021-04-16 RX ADMIN — THERA TABS 1 TABLET: TAB at 08:43

## 2021-04-16 RX ADMIN — SERTRALINE 50 MG: 50 TABLET, FILM COATED ORAL at 08:43

## 2021-04-16 NOTE — DISCHARGE SUMMARY
[facility]    Name:  Mandi Copeland  MR#:   [id number]  :  1960  ACCOUNT #:  [de-identified]  ADMIT DATE:  2021  DISCHARGE DATE:  2021    IDENTIFYING DATA:  The patient had presented as a 16-year-old single white male, resident of [place], [place]. He had shown up to emergency room with depression and anxiety with a long history of having been treated over the past 10 years with a variety of serotonin reuptake inhibitors. He had also been on alprazolam twice a day. This was no longer working and his family doctor switched him over to Ambien 5 mg at night whereupon he did much worse. The doctor slowly increased him up to 15 mg at night, dosing greater than the maximum dosing. It still was not working. He had a history since  of malignancy of the bladder, though he said he was clear for the past 5 years. He recently had return of lower abdominal pain. Laboratory testing done in the emergency room included a CBC with elevation of hemoglobin  17.4 g/dL and hematocrit 52.0 with the remainder normal.  UA showed concentrated specimen with specific gravity 1.024, trace ketones. A comprehensive metabolic panel showed slight decreased potassium 3.4 mmol/L, slight elevation of glucose 136 mg/dL on a spot blood draw, normal liver function tests, normal CPK, negative acetaminophen and salicylate levels, negative urine drug screen, negative alcohol level, negative rapid SARS COVID test.  A 12-lead EKG showed ventricular rate 111 with sinus tachycardia and otherwise normal.    HOSPITAL COURSE:  The patient was admitted to the locked adult unit where he was afforded individual, group and milieu therapies. Physical examination was done by Reyes Comas, nurse practitioner, was significant for his history of kidney stones, bladder cancer with his urologic surgeries including cystoscopy and ureteral stent.   He also has a history of benign prostatic hypertrophy being on tamsulosin, hyperlipidemia being on Crestor. His pulse was 118, but otherwise physical examination was normal.  While in the hospital, he was treated with the medications of clonazepam 0.5 mg in the morning, 1 mg at night for his anxiety and sleep problem; several doses of hydroxyzine 50 mg as needed for anxiety; sertraline 50 mg daily; tamsulosin 0.4 mg in the evening; multiple vitamin daily; trazodone 50 mg at night for sleep. His pulse did continue to be in 108-114 range. He was denying hallucinatory or delusional material.  He denied homicidal or suicidal ideas. He did not have any further abdominal pain. He was able to sleep. He wished to be discharged to home with outpatient followup, back to his own doctors. CONDITION ON DISCHARGE:  Fair. PROGNOSIS:  Fair. ASSESSMENT:  AXIS I:  Major depression, recurrent, moderate. Generalized anxiety disorder. AXIS II:  None. AXIS III:  History of treated malignant neoplasm of the bladder. Benign prostatic hypertrophy. History of polyp and diverticulosis of the colon. History of elevated prostate-specific antigen. History of hyperlipidemia. DISPOSITION:  Discharged to [facility] [address]. Follow up with therapist and provider in [facility]. Follow up with own primary care provider and oncologist.    MEDICATIONS:  1. Clonazepam 0.5 mg one q.a.m., #30.  2.  Clonazepam 1 mg tablet one at bedtime, #30.  3.  Sertraline 50 mg tablet one daily, #30.  4.  Tamsulosin 0.4 mg daily.       Ana Franklin MD      [id number]  D:  04/16/2021 10:17  T:  04/16/2021 16:15  JOB #:  [id number]

## 2021-04-16 NOTE — DISCHARGE INSTRUCTIONS
***IMPORTANT NUMBERS***        [address]        [phone] [address] [facility]       [phone]    Suicide Prevention     [phone]          Patient is alert x3 and ambulatory. Patient has copy of discharge papers with follow up appt. Patient has prescriptions to be filled at pharmacy of choice. Patient has all personal belongings and has signed form. Patient denies thoughts of self harm or harm to others at this time. Patient armband taken and shredded. Patient discharged to home address with family providing transportation.

## 2021-04-16 NOTE — BSMART NOTE
Pt. is a 29-year-old male with history of Depression. Pt. was admitted to this facility for ideations to harm self-due to sleep impairment, decrease appetite and ideations to harm self.  
  
 Pt.'s case was discussed this a.m  Pt. Has been showing improvement with mood and sleep. Pt will be dc today. SW met with pt to discuss dc plan. Pt. Expressed to feeling better. SW encouraged continued medication compliance and safety plan. Pt. Was referred to [facility] for his aftercare. ADA contacted the provider for an appointment. The provider would like for pt to stop by the office and fill out a new patient packet. The pt. Will be provided an appointment once the packet has been filled out. SW informed pt. Pt. Denied ideations and hallucinations. The pt.'s  family picked him up at [place] . Pt planned to return to his home address.   
 
 
Eva Paez MA, LMHP-R

## 2021-04-16 NOTE — PROGRESS NOTES
Behavioral Services  Medicare Certification Upon Admission    I certify that this patient's inpatient psychiatric hospital admission is medically necessary for:      [x] Treatment which could reasonably be expected to improve this patient's condition,       [] For diagnostic study;     AND     [x] The inpatient psychiatric services are provided while the individual is under the care of a physician and are included in the individualized plan of care.     Estimated length of stay/service 2 days    Plan for post-hospital care private practice    Electronically signed by [unfilled] on 4/16/2021 at 9:54 AM

## 2021-04-16 NOTE — SUICIDE SAFETY PLAN
SAFETY PLAN    A suicide Safety Plan is a document that supports someone when they are having thoughts of suicide. Warning Signs that indicate a suicidal crisis may be developing: What (situations, thoughts, feelings, body sensations, behaviors, etc.) do you experience that lets you know you are beginning to think about suicide? 1. Isolation  2. Lack of interest in everyday activities  3. Hopelessness    Internal Coping Strategies:  What things can I do (relaxation techniques, hobbies, physical activities, etc.) to take my mind off my problems without contacting another person? 1. Walking  2. Talking  3. Watching TV    People and social settings that provide distraction: Who can I call or where can I go to distract me? 1. Name: Kevyn Mary [facility])  Phone: [phone]  2. Name: Neo Schaefer (Brother)  Phone: [phone]   3. Place: Outside            4. Place: Shopping    People whom I can ask for help: Who can I call when I need help - for example, friends, family, clergy, someone else? 1. Name: Mother                     Phone: See above  2. Name: Brother     Phone: See above      Professionals or [facility] agencies I can contact during a crisis: Who can I call for help - for example, my doctor, my psychiatrist, my psychologist, a mental health provider, a suicide hotline? 1. Clinician Name: José Antonio  Dariusz    Phone: [phone]    2. Suicide Prevention Lifeline: 5-115-413-TALK (3213)    4. [facility] 24 hours/7 days a week        COPE [phone]    Making the environment safe: How can I make my environment (house/apartment/living space) safer? For example, can I remove guns, medications, and other items? 1.  Only have current prescribed medication (get rid of old prescriptions)

## 2021-04-16 NOTE — BH NOTES
Patient has been cooperative and pleasant. Patient has copy of discharge paperwork with information to make follow up appointment. Patient has prescription to be filled at pharmacy of choice. Patient denies thoughts of self harm or harm to others at this time. Patient has all personal belongings to include valuables sent to security. Patient armband taken and shredded. Patient escorted to exit by RN once family arrived to facility. Family provided transportation to home address.

## 2021-04-16 NOTE — BH NOTES
Patient has been visible in the milieu throughout the evening. Patient watched television and socialized with his peers while in the milieu. Patient attended group and participated. Patient also went to the activity room with some of his peers and played games. Staff will continue to monitor patient for safety.

## 2021-04-19 PROBLEM — Z86.010 HISTORY OF COLON POLYPS: Status: ACTIVE | Noted: 2020-12-08

## 2021-04-19 PROBLEM — Z12.11 ENCOUNTER FOR SCREENING FOR MALIGNANT NEOPLASM OF COLON: Status: ACTIVE | Noted: 2020-12-08
